# Patient Record
Sex: FEMALE | Race: WHITE | NOT HISPANIC OR LATINO | Employment: UNEMPLOYED | URBAN - METROPOLITAN AREA
[De-identification: names, ages, dates, MRNs, and addresses within clinical notes are randomized per-mention and may not be internally consistent; named-entity substitution may affect disease eponyms.]

---

## 2024-02-19 DIAGNOSIS — E11.69 TYPE 2 DIABETES MELLITUS WITH OTHER SPECIFIED COMPLICATION, WITHOUT LONG-TERM CURRENT USE OF INSULIN (MULTI): Primary | ICD-10-CM

## 2024-02-20 ENCOUNTER — OFFICE VISIT (OUTPATIENT)
Dept: PRIMARY CARE | Facility: CLINIC | Age: 44
End: 2024-02-20
Payer: COMMERCIAL

## 2024-02-20 VITALS
WEIGHT: 200 LBS | HEART RATE: 64 BPM | TEMPERATURE: 96.8 F | DIASTOLIC BLOOD PRESSURE: 66 MMHG | BODY MASS INDEX: 33.28 KG/M2 | OXYGEN SATURATION: 98 % | SYSTOLIC BLOOD PRESSURE: 124 MMHG

## 2024-02-20 DIAGNOSIS — J34.2 DEVIATED SEPTUM: ICD-10-CM

## 2024-02-20 DIAGNOSIS — G47.33 OSA (OBSTRUCTIVE SLEEP APNEA): Primary | ICD-10-CM

## 2024-02-20 PROCEDURE — 4004F PT TOBACCO SCREEN RCVD TLK: CPT | Performed by: INTERNAL MEDICINE

## 2024-02-20 PROCEDURE — 99213 OFFICE O/P EST LOW 20 MIN: CPT | Performed by: INTERNAL MEDICINE

## 2024-02-20 RX ORDER — PANTOPRAZOLE SODIUM 40 MG/1
40 TABLET, DELAYED RELEASE ORAL 2 TIMES DAILY
COMMUNITY

## 2024-02-20 RX ORDER — SEMAGLUTIDE 1.34 MG/ML
INJECTION, SOLUTION SUBCUTANEOUS
Qty: 3 ML | Refills: 11 | Status: SHIPPED | OUTPATIENT
Start: 2024-02-20

## 2024-02-20 RX ORDER — SEMAGLUTIDE 1.34 MG/ML
INJECTION, SOLUTION SUBCUTANEOUS
COMMUNITY
Start: 2021-04-19 | End: 2024-02-20 | Stop reason: ALTCHOICE

## 2024-02-20 RX ORDER — BUPROPION HYDROCHLORIDE 300 MG/1
300 TABLET ORAL DAILY
COMMUNITY

## 2024-02-20 ASSESSMENT — ENCOUNTER SYMPTOMS
BACK PAIN: 0
FLANK PAIN: 0
HEMATURIA: 0
JOINT SWELLING: 0
UNEXPECTED WEIGHT CHANGE: 0
SORE THROAT: 0
BLOOD IN STOOL: 0
CHOKING: 1
SLEEP DISTURBANCE: 0
ARTHRALGIAS: 0
ACTIVITY CHANGE: 0
SPEECH DIFFICULTY: 0
SEIZURES: 0
CONFUSION: 0
WHEEZING: 0
POLYPHAGIA: 0
ADENOPATHY: 0
POLYDIPSIA: 0
EYE PAIN: 0
ABDOMINAL PAIN: 0
FACIAL ASYMMETRY: 0
DIARRHEA: 0
HEADACHES: 0
COUGH: 0
FEVER: 0
PHOTOPHOBIA: 0
DIZZINESS: 0
TREMORS: 0
HALLUCINATIONS: 0
CHEST TIGHTNESS: 0
WEAKNESS: 0
TROUBLE SWALLOWING: 0
VOMITING: 0
CONSTIPATION: 0
NECK PAIN: 0
NAUSEA: 0
SHORTNESS OF BREATH: 0
STRIDOR: 0
NUMBNESS: 0
DYSURIA: 0
COLOR CHANGE: 0
FREQUENCY: 0
WOUND: 0
VOICE CHANGE: 0
NERVOUS/ANXIOUS: 0
MYALGIAS: 0
PALPITATIONS: 0
APPETITE CHANGE: 0
FATIGUE: 1
SINUS PAIN: 0

## 2024-02-20 NOTE — PROGRESS NOTES
Subjective   Patient ID: Debby Lazo is a 44 y.o. female who presents for Sleep Apnea (Snore, Apnea while sleeping) and GERD (Takes Pantoprazole twice a day.).    HPI   Patient presented to the office for the evaluation of Sleep apnea    STOP BANG score is 3. Intermediate risk of KIRA. She snore loudly. Tired during the day and Apneic spell while sleeping.  She also has deviated septum but never been evaluated.    She also has GERD and is on pantoprazole twice a day but sometimes she forget to take it at bedtime and that's when symptoms get worse.    Review of Systems   Constitutional:  Positive for fatigue. Negative for activity change, appetite change, fever and unexpected weight change.   HENT:  Negative for dental problem, ear discharge, hearing loss, nosebleeds, postnasal drip, sinus pain, sore throat, trouble swallowing and voice change.    Eyes:  Negative for photophobia, pain and visual disturbance.   Respiratory:  Positive for choking. Negative for cough, chest tightness, shortness of breath, wheezing and stridor.    Cardiovascular:  Negative for chest pain, palpitations and leg swelling.   Gastrointestinal:  Negative for abdominal pain, blood in stool, constipation, diarrhea, nausea and vomiting.   Endocrine: Negative for polydipsia, polyphagia and polyuria.   Genitourinary:  Negative for decreased urine volume, dyspareunia, dysuria, flank pain, frequency, hematuria and urgency.   Musculoskeletal:  Negative for arthralgias, back pain, gait problem, joint swelling, myalgias and neck pain.   Skin:  Negative for color change, rash and wound.   Allergic/Immunologic: Negative for environmental allergies and food allergies.   Neurological:  Negative for dizziness, tremors, seizures, syncope, facial asymmetry, speech difficulty, weakness, numbness and headaches.   Hematological:  Negative for adenopathy.   Psychiatric/Behavioral:  Negative for behavioral problems, confusion, hallucinations, self-injury, sleep  disturbance and suicidal ideas. The patient is not nervous/anxious.      Objective   /66   Pulse 64   Temp 36 °C (96.8 °F)   Wt 90.7 kg (200 lb)   SpO2 98%   BMI 33.28 kg/m²     Physical Exam  Constitutional:       General: She is not in acute distress.     Appearance: She is not ill-appearing or toxic-appearing.   HENT:      Head: Normocephalic.      Nose: Nose normal. No congestion or rhinorrhea.   Eyes:      General:         Right eye: No discharge.         Left eye: No discharge.      Conjunctiva/sclera: Conjunctivae normal.   Cardiovascular:      Rate and Rhythm: Normal rate and regular rhythm.      Pulses: Normal pulses.      Heart sounds: Normal heart sounds. No murmur heard.  Pulmonary:      Effort: Pulmonary effort is normal. No respiratory distress.      Breath sounds: Normal breath sounds. No wheezing, rhonchi or rales.   Musculoskeletal:         General: Normal range of motion.      Cervical back: Normal range of motion.   Skin:     General: Skin is warm.   Neurological:      General: No focal deficit present.      Mental Status: She is alert and oriented to person, place, and time.   Psychiatric:         Mood and Affect: Mood normal.         Behavior: Behavior normal.       Assessment/Plan   Problem List Items Addressed This Visit          ENT    Deviated septum     ENT referral to see if you need corrective surgery as well.         Relevant Orders    Referral to ENT       Sleep    KIRA (obstructive sleep apnea) - Primary    Relevant Orders    Home sleep apnea test (HSAT)   Please place a reminder in your phone to take Pantoprazole twice a day so you do not forget.

## 2024-03-12 ENCOUNTER — CLINICAL SUPPORT (OUTPATIENT)
Dept: SLEEP MEDICINE | Facility: CLINIC | Age: 44
End: 2024-03-12
Payer: COMMERCIAL

## 2024-03-12 DIAGNOSIS — G47.33 OSA (OBSTRUCTIVE SLEEP APNEA): ICD-10-CM

## 2024-03-12 PROCEDURE — 95806 SLEEP STUDY UNATT&RESP EFFT: CPT | Performed by: INTERNAL MEDICINE

## 2024-03-12 NOTE — PROGRESS NOTES
Type of Study: HOME SLEEP STUDY - NOMAD     The patient received equipment and instructions for use of the "[employer] Nomad HSAT device. The patient was instructed how to apply the effort belts, cannula, thermistor. It was also explained how the Nomad and oximeter components work.  The patient was asked to record their sleep for at least a 6-hour period.     The patient was informed of their responsibility for the device and acknowledged this by signing the HSAT device contract. The patient was asked to return the device on 3/13/2024 between the hours of 8am to 3pm to the Sleep Center in [facility].     The patient was instructed to call 911 as usual for any medical- emergencies while at home.  The patient was also given a phone number for troubleshooting when using the device in case there were additional questions.

## 2024-03-27 ENCOUNTER — TELEPHONE (OUTPATIENT)
Dept: PRIMARY CARE | Facility: CLINIC | Age: 44
End: 2024-03-27
Payer: COMMERCIAL

## 2024-03-27 NOTE — TELEPHONE ENCOUNTER
Pt has concerns about blood sugar being high around 300 last night and this morning. Haven't been below 200 in about a week. Wants to know what she should do?

## 2024-04-01 ENCOUNTER — OFFICE VISIT (OUTPATIENT)
Dept: ENDOCRINOLOGY | Facility: CLINIC | Age: 44
End: 2024-04-01
Payer: COMMERCIAL

## 2024-04-01 ENCOUNTER — LAB (OUTPATIENT)
Dept: LAB | Facility: LAB | Age: 44
End: 2024-04-01
Payer: COMMERCIAL

## 2024-04-01 VITALS
WEIGHT: 198 LBS | BODY MASS INDEX: 32.95 KG/M2 | HEART RATE: 84 BPM | SYSTOLIC BLOOD PRESSURE: 120 MMHG | DIASTOLIC BLOOD PRESSURE: 85 MMHG

## 2024-04-01 DIAGNOSIS — E11.65 TYPE 2 DIABETES MELLITUS WITH HYPERGLYCEMIA, WITHOUT LONG-TERM CURRENT USE OF INSULIN (MULTI): ICD-10-CM

## 2024-04-01 DIAGNOSIS — E11.65 TYPE 2 DIABETES MELLITUS WITH HYPERGLYCEMIA, WITHOUT LONG-TERM CURRENT USE OF INSULIN (MULTI): Primary | ICD-10-CM

## 2024-04-01 LAB
ALBUMIN SERPL BCP-MCNC: 4.2 G/DL (ref 3.4–5)
ALP SERPL-CCNC: 58 U/L (ref 33–110)
ALT SERPL W P-5'-P-CCNC: 11 U/L (ref 7–45)
ANION GAP SERPL CALC-SCNC: 13 MMOL/L (ref 10–20)
AST SERPL W P-5'-P-CCNC: 9 U/L (ref 9–39)
BASOPHILS # BLD AUTO: 0.04 X10*3/UL (ref 0–0.1)
BASOPHILS NFR BLD AUTO: 0.5 %
BILIRUB SERPL-MCNC: 0.3 MG/DL (ref 0–1.2)
BUN SERPL-MCNC: 10 MG/DL (ref 6–23)
C PEPTIDE SERPL-MCNC: 3.5 NG/ML (ref 0.7–3.9)
CALCIUM SERPL-MCNC: 8.5 MG/DL (ref 8.6–10.3)
CHLORIDE SERPL-SCNC: 105 MMOL/L (ref 98–107)
CHOLEST SERPL-MCNC: 131 MG/DL (ref 0–199)
CHOLESTEROL/HDL RATIO: 3.3
CO2 SERPL-SCNC: 21 MMOL/L (ref 21–32)
CREAT SERPL-MCNC: 0.9 MG/DL (ref 0.5–1.05)
CREAT UR-MCNC: 57.9 MG/DL (ref 20–320)
EGFRCR SERPLBLD CKD-EPI 2021: 82 ML/MIN/1.73M*2
EOSINOPHIL # BLD AUTO: 0.13 X10*3/UL (ref 0–0.7)
EOSINOPHIL NFR BLD AUTO: 1.8 %
ERYTHROCYTE [DISTWIDTH] IN BLOOD BY AUTOMATED COUNT: 12.7 % (ref 11.5–14.5)
GLUCOSE SERPL-MCNC: 295 MG/DL (ref 74–99)
HCT VFR BLD AUTO: 42.6 % (ref 36–46)
HDLC SERPL-MCNC: 40.3 MG/DL
HGB BLD-MCNC: 14 G/DL (ref 12–16)
IMM GRANULOCYTES # BLD AUTO: 0.01 X10*3/UL (ref 0–0.7)
IMM GRANULOCYTES NFR BLD AUTO: 0.1 % (ref 0–0.9)
LDLC SERPL CALC-MCNC: 72 MG/DL
LYMPHOCYTES # BLD AUTO: 1.43 X10*3/UL (ref 1.2–4.8)
LYMPHOCYTES NFR BLD AUTO: 19.3 %
MCH RBC QN AUTO: 29 PG (ref 26–34)
MCHC RBC AUTO-ENTMCNC: 32.9 G/DL (ref 32–36)
MCV RBC AUTO: 88 FL (ref 80–100)
MICROALBUMIN UR-MCNC: 91 MG/L
MICROALBUMIN/CREAT UR: 157.2 UG/MG CREAT
MONOCYTES # BLD AUTO: 0.48 X10*3/UL (ref 0.1–1)
MONOCYTES NFR BLD AUTO: 6.5 %
NEUTROPHILS # BLD AUTO: 5.33 X10*3/UL (ref 1.2–7.7)
NEUTROPHILS NFR BLD AUTO: 71.8 %
NON HDL CHOLESTEROL: 91 MG/DL (ref 0–149)
NRBC BLD-RTO: 0 /100 WBCS (ref 0–0)
PLATELET # BLD AUTO: 129 X10*3/UL (ref 150–450)
POC HEMOGLOBIN A1C: 7.6 % (ref 4.2–6.5)
POTASSIUM SERPL-SCNC: 3.9 MMOL/L (ref 3.5–5.3)
PROT SERPL-MCNC: 7 G/DL (ref 6.4–8.2)
RBC # BLD AUTO: 4.83 X10*6/UL (ref 4–5.2)
SODIUM SERPL-SCNC: 135 MMOL/L (ref 136–145)
TRIGL SERPL-MCNC: 96 MG/DL (ref 0–149)
TSH SERPL-ACNC: 0.86 MIU/L (ref 0.44–3.98)
VLDL: 19 MG/DL (ref 0–40)
WBC # BLD AUTO: 7.4 X10*3/UL (ref 4.4–11.3)

## 2024-04-01 PROCEDURE — 83036 HEMOGLOBIN GLYCOSYLATED A1C: CPT | Performed by: INTERNAL MEDICINE

## 2024-04-01 PROCEDURE — 3074F SYST BP LT 130 MM HG: CPT | Performed by: INTERNAL MEDICINE

## 2024-04-01 PROCEDURE — 4004F PT TOBACCO SCREEN RCVD TLK: CPT | Performed by: INTERNAL MEDICINE

## 2024-04-01 PROCEDURE — 99204 OFFICE O/P NEW MOD 45 MIN: CPT | Performed by: INTERNAL MEDICINE

## 2024-04-01 PROCEDURE — 3048F LDL-C <100 MG/DL: CPT | Performed by: INTERNAL MEDICINE

## 2024-04-01 PROCEDURE — 80061 LIPID PANEL: CPT

## 2024-04-01 PROCEDURE — 82043 UR ALBUMIN QUANTITATIVE: CPT

## 2024-04-01 PROCEDURE — 84681 ASSAY OF C-PEPTIDE: CPT

## 2024-04-01 PROCEDURE — 36415 COLL VENOUS BLD VENIPUNCTURE: CPT

## 2024-04-01 PROCEDURE — 85025 COMPLETE CBC W/AUTO DIFF WBC: CPT

## 2024-04-01 PROCEDURE — 84443 ASSAY THYROID STIM HORMONE: CPT

## 2024-04-01 PROCEDURE — 80053 COMPREHEN METABOLIC PANEL: CPT

## 2024-04-01 PROCEDURE — 82570 ASSAY OF URINE CREATININE: CPT

## 2024-04-01 PROCEDURE — 3079F DIAST BP 80-89 MM HG: CPT | Performed by: INTERNAL MEDICINE

## 2024-04-01 RX ORDER — PEN NEEDLE, DIABETIC 32GX 5/32"
NEEDLE, DISPOSABLE MISCELLANEOUS
Qty: 100 EACH | Refills: 1 | Status: SHIPPED | OUTPATIENT
Start: 2024-04-01 | End: 2024-04-03 | Stop reason: SDUPTHER

## 2024-04-01 RX ORDER — INSULIN GLARGINE 100 [IU]/ML
10 INJECTION, SOLUTION SUBCUTANEOUS NIGHTLY
Qty: 15 ML | Refills: 1 | Status: SHIPPED | OUTPATIENT
Start: 2024-04-01

## 2024-04-01 RX ORDER — ALBUTEROL SULFATE 90 UG/1
2 AEROSOL, METERED RESPIRATORY (INHALATION) EVERY 4 HOURS PRN
COMMUNITY
Start: 2014-11-26

## 2024-04-01 NOTE — LETTER
"April 1, 2024     Sasha Peres MD  [address]  [facility]  [facility] [address]    Patient: Debby Lazo   YOB: 1980   Date of Visit: 4/1/2024       Dear Dr. Sasha Peres MD:    Thank you for referring Debby Lazo to me for evaluation. Below are my notes for this consultation.  If you have questions, please do not hesitate to call me. I look forward to following your patient along with you.       Sincerely,     Tres Mckeon MD      CC: No Recipients  ______________________________________________________________________________________    History Of Present Illness  Debby Lazo is a 44 y.o. female     Duration of type 2 diabetes mellitus:  6 years  Complications:  none  History of DKA, on steroid in 2018    She has been off insulin for 5 years.     Ulcerative colitis, colectomy 2019.   Pain and bleeding at rectum started 1 month ago, occurs episodically.     Ozempic started     Patient is testing glucose 2 times daily  Records not provided    Glucose in 250s-400 for the past 2 weeks  Vision \"fuzzy\" for the past 2-3 weeks    Last eye exam:  2023    Past Medical History  She has a past medical history of Noninfective gastroenteritis and colitis, unspecified, Other conditions influencing health status (04/25/2022), and Rheumatoid arthritis without rheumatoid factor, multiple sites (CMS/[facility]).    Surgical History  She has a past surgical history that includes Cervical biopsy w/ loop electrode excision (03/16/2015) and Other surgical history (04/25/2022).     Social History  She reports that she has been smoking cigarettes. She has a 12.50 pack-year smoking history. She has never used smokeless tobacco. No history on file for alcohol use and drug use.    Family History  No family history on file.    Medications  Current Outpatient Medications   Medication Instructions   • albuterol 90 mcg/actuation inhaler 2 puffs, inhalation, Every 4 hours PRN   • buPROPion XL " (WELLBUTRIN XL) 300 mg, oral, Daily   • pantoprazole (PROTONIX) 40 mg, oral, 2 times daily   • semaglutide (Ozempic) 1 mg/dose (4 mg/3 mL) pen injector INJECT 1 MG SUBCUTANEOUSLY ONCE EVERY WEEK       Allergies  Patient has no known allergies.    Review of Systems      Last Recorded Vitals  Blood pressure 120/85, pulse 84, weight 89.8 kg (198 lb).    Physical Exam  Constitutional:       General: She is in acute distress.   HENT:      Head: Normocephalic.      Mouth/Throat:      Mouth: Mucous membranes are moist.   Eyes:      Extraocular Movements: Extraocular movements intact.   Neck:      Thyroid: No thyromegaly.   Cardiovascular:      Pulses:           Radial pulses are 2+ on the right side and 2+ on the left side.        Dorsalis pedis pulses are 2+ on the right side and 2+ on the left side.   Musculoskeletal:      Right lower leg: No edema.      Left lower leg: No edema.      Right foot: Bunion present.      Left foot: Bunion present.   Lymphadenopathy:      Cervical: No cervical adenopathy.   Skin:     Comments: No foot sores   Neurological:      Mental Status: She is alert.      Motor: No tremor.   Psychiatric:         Mood and Affect: Affect normal.          Relevant Results  Glucose (mg/dL)   Date Value   10/20/2022 135 (H)   04/12/2021 258 (H)   09/24/2019 151 (H)     Hemoglobin A1C (%)   Date Value   01/23/2023 6.8 (A)   10/20/2022 6.1 (A)   06/02/2021 6.9     Bicarbonate (mmol/L)   Date Value   10/20/2022 27   04/12/2021 23   09/24/2019 26     Urea Nitrogen (mg/dL)   Date Value   10/20/2022 12   04/12/2021 15   09/24/2019 9     Creatinine (mg/dL)   Date Value   10/20/2022 1.11 (H)   04/12/2021 0.95   09/24/2019 0.79     Lab Results   Component Value Date    CHOL 149 04/12/2021     Lab Results   Component Value Date    HDL 41.3 04/12/2021     Lab Results   Component Value Date    TRIG 112 04/12/2021     Lab Results   Component Value Date    TSH 1.08 10/20/2022       IMPRESSION  TYPE 2 DIABETES  MELLITUS  Rapid A1c 7.6%  Recent exacerbation of symptomatic hyperglycemia  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     Resume Lantus 10 units at bedtime  If fasting glucose is over 200 for 3 days, increase Lantus by 4 units    GI follow up for ulcerative colitis/proctitis, which is likely driving your new hyperglycemia    Follow up 2-3 months  Bring written glucose record (2 weeks' worth) to all appointments.

## 2024-04-01 NOTE — PATIENT INSTRUCTIONS
A1c 7.6%    RECOMMENDATIONS  Draw labs, urine albumin  Results available on [employer]  Call/message if you have not received results in 3 days.     Resume Lantus 10 units at bedtime  If fasting glucose is over 200 for 3 days, increase Lantus by 4 units    GI follow up for ulcerative colitis/proctitis, which is likely driving your new hyperglycemia    Follow up 2-3 months  Bring written glucose record (2 weeks' worth) to all appointments.

## 2024-04-01 NOTE — PROGRESS NOTES
"History Of Present Illness  Debby Lazo is a 44 y.o. female     Duration of type 2 diabetes mellitus:  6 years  Complications:  none  History of DKA, on steroid in 2018    She has been off insulin for 5 years.     Ulcerative colitis, colectomy 2019.   Pain and bleeding at rectum started 1 month ago, occurs episodically.     Ozempic started     Patient is testing glucose 2 times daily  Records not provided    Glucose in 250s-400 for the past 2 weeks  Vision \"fuzzy\" for the past 2-3 weeks    Last eye exam:  2023    Past Medical History  She has a past medical history of Noninfective gastroenteritis and colitis, unspecified, Other conditions influencing health status (04/25/2022), and Rheumatoid arthritis without rheumatoid factor, multiple sites (CMS/[facility]).    Surgical History  She has a past surgical history that includes Cervical biopsy w/ loop electrode excision (03/16/2015) and Other surgical history (04/25/2022).     Social History  She reports that she has been smoking cigarettes. She has a 12.50 pack-year smoking history. She has never used smokeless tobacco. No history on file for alcohol use and drug use.    Family History  No family history on file.    Medications  Current Outpatient Medications   Medication Instructions    albuterol 90 mcg/actuation inhaler 2 puffs, inhalation, Every 4 hours PRN    buPROPion XL (WELLBUTRIN XL) 300 mg, oral, Daily    pantoprazole (PROTONIX) 40 mg, oral, 2 times daily    semaglutide (Ozempic) 1 mg/dose (4 mg/3 mL) pen injector INJECT 1 MG SUBCUTANEOUSLY ONCE EVERY WEEK       Allergies  Patient has no known allergies.    Review of Systems      Last Recorded Vitals  Blood pressure 120/85, pulse 84, weight 89.8 kg (198 lb).    Physical Exam  Constitutional:       General: She is in acute distress.   HENT:      Head: Normocephalic.      Mouth/Throat:      Mouth: Mucous membranes are moist.   Eyes:      Extraocular Movements: Extraocular movements intact.   Neck:      Thyroid: No " thyromegaly.   Cardiovascular:      Pulses:           Radial pulses are 2+ on the right side and 2+ on the left side.        Dorsalis pedis pulses are 2+ on the right side and 2+ on the left side.   Musculoskeletal:      Right lower leg: No edema.      Left lower leg: No edema.      Right foot: Bunion present.      Left foot: Bunion present.   Lymphadenopathy:      Cervical: No cervical adenopathy.   Skin:     Comments: No foot sores   Neurological:      Mental Status: She is alert.      Motor: No tremor.   Psychiatric:         Mood and Affect: Affect normal.          Relevant Results  Glucose (mg/dL)   Date Value   10/20/2022 135 (H)   04/12/2021 258 (H)   09/24/2019 151 (H)     Hemoglobin A1C (%)   Date Value   01/23/2023 6.8 (A)   10/20/2022 6.1 (A)   06/02/2021 6.9     Bicarbonate (mmol/L)   Date Value   10/20/2022 27   04/12/2021 23   09/24/2019 26     Urea Nitrogen (mg/dL)   Date Value   10/20/2022 12   04/12/2021 15   09/24/2019 9     Creatinine (mg/dL)   Date Value   10/20/2022 1.11 (H)   04/12/2021 0.95   09/24/2019 0.79     Lab Results   Component Value Date    CHOL 149 04/12/2021     Lab Results   Component Value Date    HDL 41.3 04/12/2021     Lab Results   Component Value Date    TRIG 112 04/12/2021     Lab Results   Component Value Date    TSH 1.08 10/20/2022       IMPRESSION  TYPE 2 DIABETES MELLITUS  Rapid A1c 7.6%  Recent exacerbation of symptomatic hyperglycemia  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     Resume Lantus 10 units at bedtime  If fasting glucose is over 200 for 3 days, increase Lantus by 4 units    GI follow up for ulcerative colitis/proctitis, which is likely driving your new hyperglycemia    Follow up 2-3 months  Bring written glucose record (2  weeks' worth) to all appointments.

## 2024-04-03 ENCOUNTER — TELEPHONE (OUTPATIENT)
Dept: ENDOCRINOLOGY | Facility: CLINIC | Age: 44
End: 2024-04-03
Payer: COMMERCIAL

## 2024-04-03 DIAGNOSIS — E11.65 TYPE 2 DIABETES MELLITUS WITH HYPERGLYCEMIA, WITHOUT LONG-TERM CURRENT USE OF INSULIN (MULTI): ICD-10-CM

## 2024-04-03 RX ORDER — PEN NEEDLE, DIABETIC 32GX 5/32"
NEEDLE, DISPOSABLE MISCELLANEOUS
Qty: 400 EACH | Refills: 1 | Status: SHIPPED | OUTPATIENT
Start: 2024-04-03

## 2024-04-03 RX ORDER — INSULIN LISPRO 100 [IU]/ML
4-14 INJECTION, SOLUTION INTRAVENOUS; SUBCUTANEOUS
Qty: 15 ML | Refills: 5 | Status: SHIPPED | OUTPATIENT
Start: 2024-04-03

## 2024-04-03 NOTE — TELEPHONE ENCOUNTER
Pt called and c/o high BS readings. Stated that BS was 430 Monday night 4/1/24 and last night BS was 480 and after a long vigorous walk BS dropped to 460.     Pt is asking if a short acting insulin might be considered and if there is a scale for her BS readings for when she should start becoming alarmed.     Inquired if after the office visit on Monday 4/1/24 she resumed Lantus 10 units at bedtime as stated in office visit note and that if fasting glucose is over 200 for 3 days, increase Lantus by 4 units. Pt stated that she did start the Lantus on Monday 4/1/24.     Verified phone # and use of [facility] and pharmacy on file

## 2024-04-03 NOTE — TELEPHONE ENCOUNTER
Called and spoke to Debby and provided the below details and pt expressed verbal understanding. Pt stated will  Rx and make the changes and expressed appreciation.     MD Lula Landrum MA  Resume  Humalog before meals, take immediately before eating, adjust by premeal glucose.  4 units if glucose is 100-150, then add 2 units per 50 over glucose 151 mg/dl.    Increase Lantus to 15 units right away.      ----- Message -----  From: Lula Miguel MA  Sent: 4/3/2024  11:18 AM EDT  To: Tres Mckeon MD  Subject: Diabetic phone call                              Pt called and c/o high BS readings. Stated that BS was 430 Monday night 4/1/24 and last night BS was 480 and after a long vigorous walk BS dropped to 460.    Pt is asking if a short acting insulin might be considered and if there is a scale for her BS readings for when she should start becoming alarmed.    Inquired if after the office visit on Monday 4/1/24 she resumed Lantus 10 units at bedtime as stated in office visit note and that if fasting glucose is over 200 for 3 days, increase Lantus by 4 units. Pt stated that she did start the Lantus on Monday 4/1/24.    Verified phone # and use of [employer]    Thank you,,  Lula

## 2024-05-23 ENCOUNTER — TELEMEDICINE (OUTPATIENT)
Dept: PRIMARY CARE | Facility: CLINIC | Age: 44
End: 2024-05-23
Payer: COMMERCIAL

## 2024-05-23 DIAGNOSIS — G47.33 OBSTRUCTIVE SLEEP APNEA SYNDROME: Primary | ICD-10-CM

## 2024-05-23 PROBLEM — J32.9 SINUSITIS: Status: ACTIVE | Noted: 2024-05-23

## 2024-05-23 PROBLEM — M62.469 GASTROCNEMIUS EQUINUS: Status: ACTIVE | Noted: 2024-05-23

## 2024-05-23 PROBLEM — J34.2 DEVIATED NASAL SEPTUM: Status: ACTIVE | Noted: 2024-02-20

## 2024-05-23 PROBLEM — E66.9 OBESITY WITH BODY MASS INDEX 30 OR GREATER: Status: ACTIVE | Noted: 2024-05-23

## 2024-05-23 PROBLEM — M79.672 LEFT FOOT PAIN: Status: RESOLVED | Noted: 2024-05-23 | Resolved: 2024-05-23

## 2024-05-23 PROBLEM — F19.20 STEROID DEPENDENCE (MULTI): Status: ACTIVE | Noted: 2024-05-23

## 2024-05-23 PROBLEM — R53.83 FATIGUE: Status: RESOLVED | Noted: 2024-05-23 | Resolved: 2024-05-23

## 2024-05-23 PROBLEM — R60.0 PERIPHERAL EDEMA: Status: ACTIVE | Noted: 2024-05-23

## 2024-05-23 PROBLEM — M72.2 PLANTAR FASCIITIS: Status: ACTIVE | Noted: 2024-05-23

## 2024-05-23 PROBLEM — K22.10 EROSIVE ESOPHAGITIS: Status: ACTIVE | Noted: 2024-05-23

## 2024-05-23 PROBLEM — J06.9 URI, ACUTE: Status: ACTIVE | Noted: 2024-05-23

## 2024-05-23 PROBLEM — M25.572 ANKLE PAIN, LEFT: Status: ACTIVE | Noted: 2024-05-23

## 2024-05-23 PROBLEM — J01.00 ACUTE MAXILLARY SINUSITIS: Status: RESOLVED | Noted: 2024-05-23 | Resolved: 2024-05-23

## 2024-05-23 PROBLEM — J20.9 ACUTE BRONCHITIS: Status: RESOLVED | Noted: 2024-05-23 | Resolved: 2024-05-23

## 2024-05-23 PROBLEM — R53.82 CHRONIC FATIGUE: Status: RESOLVED | Noted: 2024-05-23 | Resolved: 2024-05-23

## 2024-05-23 PROBLEM — M79.672 LEFT FOOT PAIN: Status: ACTIVE | Noted: 2024-05-23

## 2024-05-23 PROBLEM — R60.9 PERIPHERAL EDEMA: Status: ACTIVE | Noted: 2024-05-23

## 2024-05-23 PROBLEM — F19.20 CORTICOSTEROID DEPENDENCE (MULTI): Status: ACTIVE | Noted: 2024-05-23

## 2024-05-23 PROBLEM — K52.9 COLITIS: Status: ACTIVE | Noted: 2024-05-23

## 2024-05-23 PROBLEM — Z90.49 HISTORY OF TOTAL COLECTOMY: Status: ACTIVE | Noted: 2024-05-23

## 2024-05-23 PROBLEM — R53.83 FATIGUE: Status: ACTIVE | Noted: 2024-05-23

## 2024-05-23 PROBLEM — Z93.3 COLOSTOMY STATUS (MULTI): Status: ACTIVE | Noted: 2024-05-23

## 2024-05-23 PROBLEM — J02.9 SORE THROAT: Status: ACTIVE | Noted: 2024-05-23

## 2024-05-23 PROBLEM — R53.82 CHRONIC FATIGUE: Status: ACTIVE | Noted: 2024-05-23

## 2024-05-23 PROBLEM — M19.90 OSTEOARTHRITIS: Status: ACTIVE | Noted: 2024-05-23

## 2024-05-23 PROBLEM — K61.1 PERI-RECTAL ABSCESS: Status: ACTIVE | Noted: 2024-05-23

## 2024-05-23 PROBLEM — R11.2 NAUSEA AND VOMITING: Status: ACTIVE | Noted: 2024-05-23

## 2024-05-23 PROBLEM — J42 CHRONIC BRONCHITIS (MULTI): Status: ACTIVE | Noted: 2024-05-23

## 2024-05-23 PROBLEM — E11.65 TYPE 2 DIABETES MELLITUS WITH HYPERGLYCEMIA (MULTI): Status: ACTIVE | Noted: 2024-05-23

## 2024-05-23 PROBLEM — M21.959 DEFORMITY OF LOWER EXTREMITY: Status: ACTIVE | Noted: 2024-05-23

## 2024-05-23 PROBLEM — M12.9 ARTHROPATHY: Status: ACTIVE | Noted: 2024-05-23

## 2024-05-23 PROBLEM — B37.31 CANDIDAL VULVOVAGINITIS: Status: ACTIVE | Noted: 2024-05-23

## 2024-05-23 PROBLEM — E11.9 DIABETES MELLITUS (MULTI): Status: ACTIVE | Noted: 2024-05-23

## 2024-05-23 PROBLEM — Z90.49 S/P TOTAL COLECTOMY: Status: ACTIVE | Noted: 2024-05-23

## 2024-05-23 PROBLEM — R05.3 CHRONIC COUGH: Status: RESOLVED | Noted: 2024-05-23 | Resolved: 2024-05-23

## 2024-05-23 PROBLEM — K21.9 GERD (GASTROESOPHAGEAL REFLUX DISEASE): Status: ACTIVE | Noted: 2024-05-23

## 2024-05-23 PROBLEM — N93.9 ABNORMAL UTERINE BLEEDING: Status: ACTIVE | Noted: 2024-05-23

## 2024-05-23 PROCEDURE — 99213 OFFICE O/P EST LOW 20 MIN: CPT | Performed by: FAMILY MEDICINE

## 2024-05-23 PROCEDURE — 3060F POS MICROALBUMINURIA REV: CPT | Performed by: FAMILY MEDICINE

## 2024-05-23 PROCEDURE — 3048F LDL-C <100 MG/DL: CPT | Performed by: FAMILY MEDICINE

## 2024-05-23 PROCEDURE — 4004F PT TOBACCO SCREEN RCVD TLK: CPT | Performed by: FAMILY MEDICINE

## 2024-05-23 ASSESSMENT — ENCOUNTER SYMPTOMS
HEMATURIA: 0
SHORTNESS OF BREATH: 0
DIARRHEA: 0
HEADACHES: 0
EYE PAIN: 0
NUMBNESS: 0
UNEXPECTED WEIGHT CHANGE: 0
FEVER: 0
COUGH: 0
PALPITATIONS: 0
NAUSEA: 0
DYSURIA: 0
HALLUCINATIONS: 0
FREQUENCY: 0
WEAKNESS: 0
DECREASED CONCENTRATION: 0
VOMITING: 0
ABDOMINAL PAIN: 0
SORE THROAT: 0
DIZZINESS: 0
BLOOD IN STOOL: 0
CONFUSION: 0
TROUBLE SWALLOWING: 0
JOINT SWELLING: 0
FATIGUE: 0

## 2024-05-23 NOTE — PROGRESS NOTES
Subjective   Patient ID: Debby Lazo is a 44 y.o. female.    Patient is seen virtually.  She recently started CPAP for obstructive sleep apnea.  She is seen for follow-up.  It is going well.  She is on AutoPap with settings at 5-20 cmH2O.  She is on a full mask.  She feels like she has more energy and overall it is improved her quality of life.        Review of Systems   Constitutional:  Negative for fatigue, fever and unexpected weight change.   HENT:  Negative for congestion, ear pain, hearing loss, sore throat and trouble swallowing.    Eyes:  Negative for pain and visual disturbance.   Respiratory:  Negative for cough and shortness of breath.    Cardiovascular:  Negative for chest pain, palpitations and leg swelling.   Gastrointestinal:  Negative for abdominal pain, blood in stool, diarrhea, nausea and vomiting.   Genitourinary:  Negative for dysuria, frequency, hematuria and urgency.   Musculoskeletal:  Negative for joint swelling.   Skin:  Negative for pallor and rash.   Neurological:  Negative for dizziness, syncope, weakness, numbness and headaches.   Psychiatric/Behavioral:  Negative for confusion, decreased concentration, hallucinations and suicidal ideas.      There were no vitals filed for this visit.   Objective   Physical Exam  Constitutional:       Appearance: Normal appearance. She is obese.   Neurological:      Mental Status: She is alert.         Assessment/Plan   There are no diagnoses linked to this encounter.

## 2024-05-23 NOTE — PATIENT INSTRUCTIONS
Patient is doing well on CPAP and I recommended she continue it as it is improving her quality of life.  Work on weight loss and all contributing factors that are variable.

## 2024-06-03 ENCOUNTER — OFFICE VISIT (OUTPATIENT)
Dept: ENDOCRINOLOGY | Facility: CLINIC | Age: 44
End: 2024-06-03
Payer: COMMERCIAL

## 2024-06-03 VITALS
WEIGHT: 196 LBS | DIASTOLIC BLOOD PRESSURE: 76 MMHG | BODY MASS INDEX: 32.62 KG/M2 | HEART RATE: 88 BPM | SYSTOLIC BLOOD PRESSURE: 107 MMHG

## 2024-06-03 DIAGNOSIS — E11.65 TYPE 2 DIABETES MELLITUS WITH HYPERGLYCEMIA, WITHOUT LONG-TERM CURRENT USE OF INSULIN (MULTI): Primary | ICD-10-CM

## 2024-06-03 PROCEDURE — 3048F LDL-C <100 MG/DL: CPT | Performed by: INTERNAL MEDICINE

## 2024-06-03 PROCEDURE — 3060F POS MICROALBUMINURIA REV: CPT | Performed by: INTERNAL MEDICINE

## 2024-06-03 PROCEDURE — 3078F DIAST BP <80 MM HG: CPT | Performed by: INTERNAL MEDICINE

## 2024-06-03 PROCEDURE — 3074F SYST BP LT 130 MM HG: CPT | Performed by: INTERNAL MEDICINE

## 2024-06-03 PROCEDURE — 99213 OFFICE O/P EST LOW 20 MIN: CPT | Performed by: INTERNAL MEDICINE

## 2024-06-03 PROCEDURE — 4004F PT TOBACCO SCREEN RCVD TLK: CPT | Performed by: INTERNAL MEDICINE

## 2024-06-03 ASSESSMENT — ENCOUNTER SYMPTOMS
NAUSEA: 0
CONSTIPATION: 0
APNEA: 1
DIARRHEA: 0
SHORTNESS OF BREATH: 0
COUGH: 0
ARTHRALGIAS: 0
APPETITE CHANGE: 0
POLYDIPSIA: 0
FREQUENCY: 0
NERVOUS/ANXIOUS: 0
FEVER: 0
VOMITING: 0
ABDOMINAL PAIN: 0
SORE THROAT: 0
HEADACHES: 0

## 2024-06-03 NOTE — PATIENT INSTRUCTIONS
RECOMMENDATIONS  Increase Lantus to 22 units at bedtime    Follow up 2-3 months  A1c at next appointment  Bring written glucose record (2 weeks' worth) to all appointments.

## 2024-06-03 NOTE — LETTER
"Shereen 3, 2024     Sasha Peres MD  [address]  [facility], [address]  [facility] [address]    Patient: Debby Lazo   YOB: 1980   Date of Visit: 6/3/2024       Dear Dr. Sasha Peres MD:    Thank you for referring Debby Lazo to me for evaluation. Below are my notes for this consultation.  If you have questions, please do not hesitate to call me. I look forward to following your patient along with you.       Sincerely,     Tres Mckeon MD      CC: No Recipients  ______________________________________________________________________________________    History Of Present Illness  Debby Lazo is a 44 y.o. female     Duration of type 2 diabetes mellitus:  6 years  Complications:  none  History of DKA, on steroid in 2018     Ulcerative colitis, colectomy 2019.      Lantus 18 units at bedtime  Humalog 4 units if glucose is 100-150, then add 2 units per 50 over glucose 151 mg/dl.   Ozempic 1 mg/week     Patient is testing glucose 2 times daily  Records reviewed, on file    Hypoglycemia once, in afternoon after eating less than a full lunch  Felt like she was covered in \"lava-coated porcupines.\"     Glucose in 250s-400 for the past 2 weeks  Vision \"fuzzy\" for the past 2-3 weeks     Last eye exam:  2023      Past Medical History  She has a past medical history of Acute maxillary sinusitis (05/23/2024), Noninfective gastroenteritis and colitis, unspecified, Other conditions influencing health status (04/25/2022), and Rheumatoid arthritis without rheumatoid factor, multiple sites (Multi).    Surgical History  She has a past surgical history that includes Cervical biopsy w/ loop electrode excision (03/16/2015) and Other surgical history (04/25/2022).     Social History  She reports that she has been smoking cigarettes. She has a 12.5 pack-year smoking history. She has never used smokeless tobacco. No history on file for alcohol use and drug use.    Family History  No family " "history on file.    Medications  Current Outpatient Medications   Medication Instructions   • albuterol 90 mcg/actuation inhaler 2 puffs, inhalation, Every 4 hours PRN   • BD Jazmine 2nd Gen Pen Needle 32 gauge x 5/32\" needle Four times daily   • buPROPion XL (WELLBUTRIN XL) 300 mg, oral, Daily   • insulin glargine (LANTUS) 10 Units, subcutaneous, Nightly   • insulin lispro (HUMALOG KWIKPEN INSULIN) 4-14 Units, subcutaneous, 3 times daily (morning, midday, late afternoon), Take as directed per insulin instructions.   • pantoprazole (PROTONIX) 40 mg, oral, 2 times daily   • semaglutide (Ozempic) 1 mg/dose (4 mg/3 mL) pen injector INJECT 1 MG SUBCUTANEOUSLY ONCE EVERY WEEK       Allergies  Patient has no known allergies.    Review of Systems   Constitutional:  Negative for appetite change and fever.   HENT:  Negative for sore throat.         Dry mouth   Eyes:  Negative for visual disturbance.   Respiratory:  Positive for apnea (sleep). Negative for cough and shortness of breath.    Cardiovascular:  Negative for chest pain.   Gastrointestinal:  Negative for abdominal pain, constipation, diarrhea, nausea and vomiting.   Endocrine: Negative for polydipsia and polyuria.   Genitourinary:  Negative for frequency.   Musculoskeletal:  Negative for arthralgias.   Skin:  Negative for rash.   Neurological:  Negative for headaches.   Psychiatric/Behavioral:  The patient is not nervous/anxious.          Last Recorded Vitals  Blood pressure 107/76, pulse 88, weight 88.9 kg (196 lb).    Physical Exam  Constitutional:       General: She is not in acute distress.  HENT:      Head: Normocephalic.   Neurological:      Mental Status: She is alert.   Psychiatric:         Mood and Affect: Affect normal.          Relevant Results  Glucose (mg/dL)   Date Value   04/01/2024 295 (H)   10/20/2022 135 (H)   04/12/2021 258 (H)   09/24/2019 151 (H)     POC HEMOGLOBIN A1c (%)   Date Value   04/01/2024 7.6 (A)     Hemoglobin A1C (%)   Date Value "   01/23/2023 6.8 (A)   10/20/2022 6.1 (A)   06/02/2021 6.9     Bicarbonate (mmol/L)   Date Value   04/01/2024 21   10/20/2022 27   04/12/2021 23   09/24/2019 26     Urea Nitrogen (mg/dL)   Date Value   04/01/2024 10   10/20/2022 12   04/12/2021 15   09/24/2019 9     Creatinine (mg/dL)   Date Value   04/01/2024 0.90   10/20/2022 1.11 (H)   04/12/2021 0.95   09/24/2019 0.79     Lab Results   Component Value Date    CHOL 131 04/01/2024    CHOL 149 04/12/2021     Lab Results   Component Value Date    HDL 40.3 04/01/2024    HDL 41.3 04/12/2021     Lab Results   Component Value Date    LDLCALC 72 04/01/2024     Lab Results   Component Value Date    TRIG 96 04/01/2024    TRIG 112 04/12/2021     Lab Results   Component Value Date    TSH 0.86 04/01/2024       IMPRESSION  TYPE 2 DIABETES MELLITUS  Resumed insulin  Fasting hyperglycemia  One hypoglycemic reaction when she did not finish the meal      RECOMMENDATIONS  Increase Lantus to 22 units at bedtime    Follow up 2-3 months  A1c at next appointment  Bring written glucose record (2 weeks' worth) to all appointments.

## 2024-06-03 NOTE — PROGRESS NOTES
"History Of Present Illness  Debby Lazo is a 44 y.o. female     Duration of type 2 diabetes mellitus:  6 years  Complications:  none  History of DKA, on steroid in 2018     Ulcerative colitis, colectomy 2019.      Lantus 18 units at bedtime  Humalog 4 units if glucose is 100-150, then add 2 units per 50 over glucose 151 mg/dl.   Ozempic 1 mg/week     Patient is testing glucose 2 times daily  Records reviewed, on file    Hypoglycemia once, in afternoon after eating less than a full lunch  Felt like she was covered in \"lava-coated porcupines.\"     Glucose in 250s-400 for the past 2 weeks  Vision \"fuzzy\" for the past 2-3 weeks     Last eye exam:  2023      Past Medical History  She has a past medical history of Acute maxillary sinusitis (05/23/2024), Noninfective gastroenteritis and colitis, unspecified, Other conditions influencing health status (04/25/2022), and Rheumatoid arthritis without rheumatoid factor, multiple sites (Multi).    Surgical History  She has a past surgical history that includes Cervical biopsy w/ loop electrode excision (03/16/2015) and Other surgical history (04/25/2022).     Social History  She reports that she has been smoking cigarettes. She has a 12.5 pack-year smoking history. She has never used smokeless tobacco. No history on file for alcohol use and drug use.    Family History  No family history on file.    Medications  Current Outpatient Medications   Medication Instructions    albuterol 90 mcg/actuation inhaler 2 puffs, inhalation, Every 4 hours PRN    BD Jazmine 2nd Gen Pen Needle 32 gauge x 5/32\" needle Four times daily    buPROPion XL (WELLBUTRIN XL) 300 mg, oral, Daily    insulin glargine (LANTUS) 10 Units, subcutaneous, Nightly    insulin lispro (HUMALOG KWIKPEN INSULIN) 4-14 Units, subcutaneous, 3 times daily (morning, midday, late afternoon), Take as directed per insulin instructions.    pantoprazole (PROTONIX) 40 mg, oral, 2 times daily    semaglutide (Ozempic) 1 mg/dose (4 mg/3 " mL) pen injector INJECT 1 MG SUBCUTANEOUSLY ONCE EVERY WEEK       Allergies  Patient has no known allergies.    Review of Systems   Constitutional:  Negative for appetite change and fever.   HENT:  Negative for sore throat.         Dry mouth   Eyes:  Negative for visual disturbance.   Respiratory:  Positive for apnea (sleep). Negative for cough and shortness of breath.    Cardiovascular:  Negative for chest pain.   Gastrointestinal:  Negative for abdominal pain, constipation, diarrhea, nausea and vomiting.   Endocrine: Negative for polydipsia and polyuria.   Genitourinary:  Negative for frequency.   Musculoskeletal:  Negative for arthralgias.   Skin:  Negative for rash.   Neurological:  Negative for headaches.   Psychiatric/Behavioral:  The patient is not nervous/anxious.          Last Recorded Vitals  Blood pressure 107/76, pulse 88, weight 88.9 kg (196 lb).    Physical Exam  Constitutional:       General: She is not in acute distress.  HENT:      Head: Normocephalic.   Neurological:      Mental Status: She is alert.   Psychiatric:         Mood and Affect: Affect normal.          Relevant Results  Glucose (mg/dL)   Date Value   04/01/2024 295 (H)   10/20/2022 135 (H)   04/12/2021 258 (H)   09/24/2019 151 (H)     POC HEMOGLOBIN A1c (%)   Date Value   04/01/2024 7.6 (A)     Hemoglobin A1C (%)   Date Value   01/23/2023 6.8 (A)   10/20/2022 6.1 (A)   06/02/2021 6.9     Bicarbonate (mmol/L)   Date Value   04/01/2024 21   10/20/2022 27   04/12/2021 23   09/24/2019 26     Urea Nitrogen (mg/dL)   Date Value   04/01/2024 10   10/20/2022 12   04/12/2021 15   09/24/2019 9     Creatinine (mg/dL)   Date Value   04/01/2024 0.90   10/20/2022 1.11 (H)   04/12/2021 0.95   09/24/2019 0.79     Lab Results   Component Value Date    CHOL 131 04/01/2024    CHOL 149 04/12/2021     Lab Results   Component Value Date    HDL 40.3 04/01/2024    HDL 41.3 04/12/2021     Lab Results   Component Value Date    LDLCALC 72 04/01/2024     Lab  Results   Component Value Date    TRIG 96 04/01/2024    TRIG 112 04/12/2021     Lab Results   Component Value Date    TSH 0.86 04/01/2024       IMPRESSION  TYPE 2 DIABETES MELLITUS  Resumed insulin  Fasting hyperglycemia  One hypoglycemic reaction when she did not finish the meal      RECOMMENDATIONS  Increase Lantus to 22 units at bedtime    Follow up 2-3 months  A1c at next appointment  Bring written glucose record (2 weeks' worth) to all appointments.

## 2024-07-08 DIAGNOSIS — E11.65 TYPE 2 DIABETES MELLITUS WITH HYPERGLYCEMIA, WITHOUT LONG-TERM CURRENT USE OF INSULIN (MULTI): ICD-10-CM

## 2024-07-08 RX ORDER — INSULIN GLARGINE 100 [IU]/ML
10 INJECTION, SOLUTION SUBCUTANEOUS NIGHTLY
Qty: 15 ML | Refills: 3 | Status: SHIPPED | OUTPATIENT
Start: 2024-07-08

## 2024-08-19 ENCOUNTER — APPOINTMENT (OUTPATIENT)
Dept: ENDOCRINOLOGY | Facility: CLINIC | Age: 44
End: 2024-08-19
Payer: COMMERCIAL

## 2024-08-19 VITALS
DIASTOLIC BLOOD PRESSURE: 77 MMHG | HEART RATE: 80 BPM | BODY MASS INDEX: 33.12 KG/M2 | SYSTOLIC BLOOD PRESSURE: 114 MMHG | WEIGHT: 199 LBS

## 2024-08-19 DIAGNOSIS — E11.65 TYPE 2 DIABETES MELLITUS WITH HYPERGLYCEMIA, WITHOUT LONG-TERM CURRENT USE OF INSULIN (MULTI): ICD-10-CM

## 2024-08-19 LAB — POC HEMOGLOBIN A1C: 7.3 % (ref 4.2–6.5)

## 2024-08-19 PROCEDURE — 83036 HEMOGLOBIN GLYCOSYLATED A1C: CPT | Performed by: INTERNAL MEDICINE

## 2024-08-19 PROCEDURE — 3074F SYST BP LT 130 MM HG: CPT | Performed by: INTERNAL MEDICINE

## 2024-08-19 PROCEDURE — 99214 OFFICE O/P EST MOD 30 MIN: CPT | Performed by: INTERNAL MEDICINE

## 2024-08-19 PROCEDURE — 3060F POS MICROALBUMINURIA REV: CPT | Performed by: INTERNAL MEDICINE

## 2024-08-19 PROCEDURE — 4004F PT TOBACCO SCREEN RCVD TLK: CPT | Performed by: INTERNAL MEDICINE

## 2024-08-19 PROCEDURE — 3078F DIAST BP <80 MM HG: CPT | Performed by: INTERNAL MEDICINE

## 2024-08-19 PROCEDURE — 3048F LDL-C <100 MG/DL: CPT | Performed by: INTERNAL MEDICINE

## 2024-08-19 ASSESSMENT — ENCOUNTER SYMPTOMS
PALPITATIONS: 1
FREQUENCY: 0
ROS GI COMMENTS: OSTOMY
APPETITE CHANGE: 0
FEVER: 0
COUGH: 0
ARTHRALGIAS: 0
HEADACHES: 0
SORE THROAT: 0
NERVOUS/ANXIOUS: 1
ABDOMINAL PAIN: 0
POLYDIPSIA: 0
SHORTNESS OF BREATH: 0

## 2024-08-19 NOTE — PROGRESS NOTES
"History Of Present Illness  Debby Lazo is a 44 y.o. female     Duration of type 2 diabetes mellitus:  6 years  Complications:  none  History of DKA, on steroid in 2018     Ulcerative colitis, colectomy 2019.       Stopped insulin and Ozempic for 6 weeks and stopped checking glucose    Started berberine supplement    Patient is testing glucose 2 times daily, resumed 3 days ago  Records reviewed, on file      Last eye exam:  2023       Past Medical History  She has a past medical history of Acute maxillary sinusitis (05/23/2024), Noninfective gastroenteritis and colitis, unspecified, Other conditions influencing health status (04/25/2022), and Rheumatoid arthritis without rheumatoid factor, multiple sites (Multi).    Surgical History  She has a past surgical history that includes Cervical biopsy w/ loop electrode excision (03/16/2015) and Other surgical history (04/25/2022).     Social History  She reports that she has been smoking cigarettes. She has a 12.5 pack-year smoking history. She has never used smokeless tobacco. No history on file for alcohol use and drug use.    Family History  No family history on file.    Medications  Current Outpatient Medications   Medication Instructions    albuterol 90 mcg/actuation inhaler 2 puffs, inhalation, Every 4 hours PRN    BD Jazmine 2nd Gen Pen Needle 32 gauge x 5/32\" needle Four times daily    buPROPion XL (WELLBUTRIN XL) 300 mg, oral, Daily    insulin lispro (HUMALOG KWIKPEN INSULIN) 4-14 Units, subcutaneous, 3 times daily (morning, midday, late afternoon), Take as directed per insulin instructions.    Lantus Solostar U-100 Insulin 10 Units, subcutaneous, Nightly    pantoprazole (PROTONIX) 40 mg, oral, 2 times daily    semaglutide (Ozempic) 1 mg/dose (4 mg/3 mL) pen injector INJECT 1 MG SUBCUTANEOUSLY ONCE EVERY WEEK       Allergies  Patient has no known allergies.    Review of Systems   Constitutional:  Negative for appetite change and fever.   HENT:  Negative for sore " throat.         Dry mouth   Eyes:  Negative for visual disturbance.   Respiratory:  Negative for cough and shortness of breath.    Cardiovascular:  Positive for palpitations. Negative for chest pain.   Gastrointestinal:  Negative for abdominal pain.        Ostomy   Endocrine: Negative for polydipsia and polyuria.   Genitourinary:  Negative for frequency.   Musculoskeletal:  Negative for arthralgias.   Skin:  Negative for rash.   Neurological:  Negative for headaches.   Psychiatric/Behavioral:  The patient is nervous/anxious.          Last Recorded Vitals  Blood pressure 114/77, pulse 80, weight 90.3 kg (199 lb).    Physical Exam  Constitutional:       General: She is not in acute distress.  HENT:      Head: Normocephalic.      Mouth/Throat:      Mouth: Mucous membranes are moist.   Eyes:      Extraocular Movements: Extraocular movements intact.   Neck:      Thyroid: No thyroid mass or thyromegaly.   Cardiovascular:      Pulses:           Radial pulses are 2+ on the right side and 2+ on the left side.   Musculoskeletal:      Right lower leg: No edema.      Left lower leg: No edema.      Right foot: Bunion (5th MTP) present.      Left foot: No deformity.   Lymphadenopathy:      Cervical: No cervical adenopathy.   Skin:     Comments: No foot sores   Neurological:      Mental Status: She is alert.      Motor: No tremor.   Psychiatric:         Mood and Affect: Affect normal.          Relevant Results  Glucose (mg/dL)   Date Value   04/01/2024 295 (H)   10/20/2022 135 (H)   04/12/2021 258 (H)   09/24/2019 151 (H)     POC HEMOGLOBIN A1c (%)   Date Value   04/01/2024 7.6 (A)     Hemoglobin A1C (%)   Date Value   01/23/2023 6.8 (A)   10/20/2022 6.1 (A)   06/02/2021 6.9     Bicarbonate (mmol/L)   Date Value   04/01/2024 21   10/20/2022 27   04/12/2021 23   09/24/2019 26     Urea Nitrogen (mg/dL)   Date Value   04/01/2024 10   10/20/2022 12   04/12/2021 15   09/24/2019 9     Creatinine (mg/dL)   Date Value   04/01/2024 0.90    10/20/2022 1.11 (H)   04/12/2021 0.95   09/24/2019 0.79     Lab Results   Component Value Date    CHOL 131 04/01/2024    CHOL 149 04/12/2021     Lab Results   Component Value Date    HDL 40.3 04/01/2024    HDL 41.3 04/12/2021     Lab Results   Component Value Date    LDLCALC 72 04/01/2024     Lab Results   Component Value Date    TRIG 96 04/01/2024    TRIG 112 04/12/2021     Lab Results   Component Value Date    TSH 0.86 04/01/2024       IMPRESSION  TYPE 2 DIABETES MELLITUS WITH HYPERGLYCEMIA  Rapid A1c 7.3%  Glucose settled down despite stopping insulin and Ozempic  Advised diabetes is permanent, even if controlled with diet      RECOMMENDATIONS  Resume Lantus 6 units at bedtime  Target fasting glucose  mg/dl    Follow up 3 months  Bring written glucose record (2 weeks' worth) to all appointments.     Do not restart Ozempic at 1 mg/week.

## 2024-08-19 NOTE — PATIENT INSTRUCTIONS
A1c 7.3%    RECOMMENDATIONS  Resume Lantus 6 units at bedtime  Target fasting glucose  mg/dl    Follow up 3 months  Bring written glucose record (2 weeks' worth) to all appointments.     Do not restart Ozempic at 1 mg/week.

## 2024-08-19 NOTE — LETTER
"August 19, 2024     Sasha Peres MD  [address]  [facility] [address]    Patient: Debby Lazo   YOB: 1980   Date of Visit: 8/19/2024       Dear Dr. Sasha Peres MD:    Thank you for referring Debby Lazo to me for evaluation. Below are my notes for this consultation.  If you have questions, please do not hesitate to call me. I look forward to following your patient along with you.       Sincerely,     Tres Mckeon MD      CC: No Recipients  ______________________________________________________________________________________    History Of Present Illness  Debby Lazo is a 44 y.o. female     Duration of type 2 diabetes mellitus:  6 years  Complications:  none  History of DKA, on steroid in 2018     Ulcerative colitis, colectomy 2019.       Stopped insulin and Ozempic for 6 weeks and stopped checking glucose    Started berberine supplement    Patient is testing glucose 2 times daily, resumed 3 days ago  Records reviewed, on file      Last eye exam:  2023       Past Medical History  She has a past medical history of Acute maxillary sinusitis (05/23/2024), Noninfective gastroenteritis and colitis, unspecified, Other conditions influencing health status (04/25/2022), and Rheumatoid arthritis without rheumatoid factor, multiple sites (Multi).    Surgical History  She has a past surgical history that includes Cervical biopsy w/ loop electrode excision (03/16/2015) and Other surgical history (04/25/2022).     Social History  She reports that she has been smoking cigarettes. She has a 12.5 pack-year smoking history. She has never used smokeless tobacco. No history on file for alcohol use and drug use.    Family History  No family history on file.    Medications  Current Outpatient Medications   Medication Instructions   • albuterol 90 mcg/actuation inhaler 2 puffs, inhalation, Every 4 hours PRN   • BD Jazmine 2nd Gen Pen Needle 32 gauge x 5/32\" needle " Four times daily   • buPROPion XL (WELLBUTRIN XL) 300 mg, oral, Daily   • insulin lispro (HUMALOG KWIKPEN INSULIN) 4-14 Units, subcutaneous, 3 times daily (morning, midday, late afternoon), Take as directed per insulin instructions.   • Lantus Solostar U-100 Insulin 10 Units, subcutaneous, Nightly   • pantoprazole (PROTONIX) 40 mg, oral, 2 times daily   • semaglutide (Ozempic) 1 mg/dose (4 mg/3 mL) pen injector INJECT 1 MG SUBCUTANEOUSLY ONCE EVERY WEEK       Allergies  Patient has no known allergies.    Review of Systems   Constitutional:  Negative for appetite change and fever.   HENT:  Negative for sore throat.         Dry mouth   Eyes:  Negative for visual disturbance.   Respiratory:  Negative for cough and shortness of breath.    Cardiovascular:  Positive for palpitations. Negative for chest pain.   Gastrointestinal:  Negative for abdominal pain.        Ostomy   Endocrine: Negative for polydipsia and polyuria.   Genitourinary:  Negative for frequency.   Musculoskeletal:  Negative for arthralgias.   Skin:  Negative for rash.   Neurological:  Negative for headaches.   Psychiatric/Behavioral:  The patient is nervous/anxious.          Last Recorded Vitals  Blood pressure 114/77, pulse 80, weight 90.3 kg (199 lb).    Physical Exam  Constitutional:       General: She is not in acute distress.  HENT:      Head: Normocephalic.      Mouth/Throat:      Mouth: Mucous membranes are moist.   Eyes:      Extraocular Movements: Extraocular movements intact.   Neck:      Thyroid: No thyroid mass or thyromegaly.   Cardiovascular:      Pulses:           Radial pulses are 2+ on the right side and 2+ on the left side.   Musculoskeletal:      Right lower leg: No edema.      Left lower leg: No edema.      Right foot: Bunion (5th MTP) present.      Left foot: No deformity.   Lymphadenopathy:      Cervical: No cervical adenopathy.   Skin:     Comments: No foot sores   Neurological:      Mental Status: She is alert.      Motor: No  tremor.   Psychiatric:         Mood and Affect: Affect normal.          Relevant Results  Glucose (mg/dL)   Date Value   04/01/2024 295 (H)   10/20/2022 135 (H)   04/12/2021 258 (H)   09/24/2019 151 (H)     POC HEMOGLOBIN A1c (%)   Date Value   04/01/2024 7.6 (A)     Hemoglobin A1C (%)   Date Value   01/23/2023 6.8 (A)   10/20/2022 6.1 (A)   06/02/2021 6.9     Bicarbonate (mmol/L)   Date Value   04/01/2024 21   10/20/2022 27   04/12/2021 23   09/24/2019 26     Urea Nitrogen (mg/dL)   Date Value   04/01/2024 10   10/20/2022 12   04/12/2021 15   09/24/2019 9     Creatinine (mg/dL)   Date Value   04/01/2024 0.90   10/20/2022 1.11 (H)   04/12/2021 0.95   09/24/2019 0.79     Lab Results   Component Value Date    CHOL 131 04/01/2024    CHOL 149 04/12/2021     Lab Results   Component Value Date    HDL 40.3 04/01/2024    HDL 41.3 04/12/2021     Lab Results   Component Value Date    LDLCALC 72 04/01/2024     Lab Results   Component Value Date    TRIG 96 04/01/2024    TRIG 112 04/12/2021     Lab Results   Component Value Date    TSH 0.86 04/01/2024       IMPRESSION  TYPE 2 DIABETES MELLITUS WITH HYPERGLYCEMIA  Rapid A1c 7.3%  Glucose settled down despite stopping insulin and Ozempic  Advised diabetes is permanent, even if controlled with diet      RECOMMENDATIONS  Resume Lantus 6 units at bedtime  Target fasting glucose  mg/dl    Follow up 3 months  Bring written glucose record (2 weeks' worth) to all appointments.     Do not restart Ozempic at 1 mg/week.

## 2024-09-11 DIAGNOSIS — K21.9 GASTRO-ESOPHAGEAL REFLUX DISEASE WITHOUT ESOPHAGITIS: ICD-10-CM

## 2024-09-11 RX ORDER — PANTOPRAZOLE SODIUM 40 MG/1
40 TABLET, DELAYED RELEASE ORAL 2 TIMES DAILY
Qty: 60 TABLET | Refills: 8 | Status: SHIPPED | OUTPATIENT
Start: 2024-09-11

## 2024-09-23 ENCOUNTER — TELEPHONE (OUTPATIENT)
Dept: ENDOCRINOLOGY | Facility: CLINIC | Age: 44
End: 2024-09-23
Payer: COMMERCIAL

## 2024-09-23 DIAGNOSIS — E11.65 TYPE 2 DIABETES MELLITUS WITH HYPERGLYCEMIA, WITHOUT LONG-TERM CURRENT USE OF INSULIN: Primary | ICD-10-CM

## 2024-09-23 RX ORDER — INSULIN LISPRO 100 [IU]/ML
8-12 INJECTION, SOLUTION INTRAVENOUS; SUBCUTANEOUS
Qty: 30 ML | Refills: 3 | Status: SHIPPED | OUTPATIENT
Start: 2024-09-23 | End: 2025-09-23

## 2024-09-23 NOTE — TELEPHONE ENCOUNTER
Pt called and stated she is out of Humalog as of yesterday 9/22/24. She has been using Humalog 16-20 units in total per day for the last 3 weeks. Verified [facility] in [place]

## 2024-12-02 ENCOUNTER — APPOINTMENT (OUTPATIENT)
Dept: ENDOCRINOLOGY | Facility: CLINIC | Age: 44
End: 2024-12-02
Payer: COMMERCIAL

## 2024-12-10 DIAGNOSIS — F17.200 TOBACCO USE DISORDER: Primary | Chronic | ICD-10-CM

## 2024-12-11 RX ORDER — ALBUTEROL SULFATE 90 UG/1
2 INHALANT RESPIRATORY (INHALATION) EVERY 4 HOURS PRN
Qty: 18 G | Refills: 3 | Status: SHIPPED | OUTPATIENT
Start: 2024-12-11

## 2025-03-26 DIAGNOSIS — E11.69 TYPE 2 DIABETES MELLITUS WITH OTHER SPECIFIED COMPLICATION, WITHOUT LONG-TERM CURRENT USE OF INSULIN: ICD-10-CM

## 2025-03-26 RX ORDER — SEMAGLUTIDE 1.34 MG/ML
INJECTION, SOLUTION SUBCUTANEOUS
Qty: 3 ML | Refills: 11 | Status: SHIPPED | OUTPATIENT
Start: 2025-03-26